# Patient Record
Sex: MALE | Race: BLACK OR AFRICAN AMERICAN | NOT HISPANIC OR LATINO | URBAN - METROPOLITAN AREA
[De-identification: names, ages, dates, MRNs, and addresses within clinical notes are randomized per-mention and may not be internally consistent; named-entity substitution may affect disease eponyms.]

---

## 2023-10-17 ENCOUNTER — HOSPITAL ENCOUNTER (EMERGENCY)
Facility: OTHER | Age: 23
Discharge: HOME | End: 2023-10-18
Attending: EMERGENCY MEDICINE

## 2023-10-17 DIAGNOSIS — R11.2 NAUSEA AND VOMITING, UNSPECIFIED VOMITING TYPE: Primary | ICD-10-CM

## 2023-10-17 LAB
ALBUMIN SERPL BCP-MCNC: 4 G/DL (ref 3.5–5.2)
ALP SERPL-CCNC: 48 U/L (ref 55–135)
ALT SERPL W/O P-5'-P-CCNC: 11 U/L (ref 10–44)
ANION GAP SERPL CALC-SCNC: 7 MMOL/L (ref 8–16)
AST SERPL-CCNC: 12 U/L (ref 10–40)
BASOPHILS # BLD AUTO: 0.03 K/UL (ref 0–0.2)
BASOPHILS NFR BLD: 0.5 % (ref 0–1.9)
BILIRUB SERPL-MCNC: 0.3 MG/DL (ref 0.1–1)
BUN SERPL-MCNC: 11 MG/DL (ref 6–20)
CALCIUM SERPL-MCNC: 8.9 MG/DL (ref 8.7–10.5)
CHLORIDE SERPL-SCNC: 108 MMOL/L (ref 95–110)
CO2 SERPL-SCNC: 26 MMOL/L (ref 23–29)
CREAT SERPL-MCNC: 0.9 MG/DL (ref 0.5–1.4)
DIFFERENTIAL METHOD: ABNORMAL
EOSINOPHIL # BLD AUTO: 0.2 K/UL (ref 0–0.5)
EOSINOPHIL NFR BLD: 3 % (ref 0–8)
ERYTHROCYTE [DISTWIDTH] IN BLOOD BY AUTOMATED COUNT: 13.3 % (ref 11.5–14.5)
EST. GFR  (NO RACE VARIABLE): >60 ML/MIN/1.73 M^2
GLUCOSE SERPL-MCNC: 80 MG/DL (ref 70–110)
HCT VFR BLD AUTO: 40.5 % (ref 40–54)
HGB BLD-MCNC: 13.6 G/DL (ref 14–18)
IMM GRANULOCYTES # BLD AUTO: 0 K/UL (ref 0–0.04)
IMM GRANULOCYTES NFR BLD AUTO: 0 % (ref 0–0.5)
LIPASE SERPL-CCNC: 96 U/L (ref 4–60)
LYMPHOCYTES # BLD AUTO: 3.7 K/UL (ref 1–4.8)
LYMPHOCYTES NFR BLD: 61.6 % (ref 18–48)
MCH RBC QN AUTO: 29.3 PG (ref 27–31)
MCHC RBC AUTO-ENTMCNC: 33.6 G/DL (ref 32–36)
MCV RBC AUTO: 87 FL (ref 82–98)
MONOCYTES # BLD AUTO: 0.5 K/UL (ref 0.3–1)
MONOCYTES NFR BLD: 7.7 % (ref 4–15)
NEUTROPHILS # BLD AUTO: 1.7 K/UL (ref 1.8–7.7)
NEUTROPHILS NFR BLD: 27.2 % (ref 38–73)
NRBC BLD-RTO: 0 /100 WBC
PLATELET # BLD AUTO: 147 K/UL (ref 150–450)
PMV BLD AUTO: 11.5 FL (ref 9.2–12.9)
POTASSIUM SERPL-SCNC: 3.6 MMOL/L (ref 3.5–5.1)
PROT SERPL-MCNC: 6.4 G/DL (ref 6–8.4)
RBC # BLD AUTO: 4.64 M/UL (ref 4.6–6.2)
SODIUM SERPL-SCNC: 141 MMOL/L (ref 136–145)
WBC # BLD AUTO: 6.07 K/UL (ref 3.9–12.7)

## 2023-10-17 PROCEDURE — 80053 COMPREHEN METABOLIC PANEL: CPT | Performed by: EMERGENCY MEDICINE

## 2023-10-17 PROCEDURE — 83690 ASSAY OF LIPASE: CPT | Performed by: EMERGENCY MEDICINE

## 2023-10-17 PROCEDURE — 99283 EMERGENCY DEPT VISIT LOW MDM: CPT

## 2023-10-17 PROCEDURE — 85025 COMPLETE CBC W/AUTO DIFF WBC: CPT | Performed by: EMERGENCY MEDICINE

## 2023-10-17 PROCEDURE — 25000003 PHARM REV CODE 250: Performed by: EMERGENCY MEDICINE

## 2023-10-17 RX ORDER — ONDANSETRON 4 MG/1
4 TABLET, ORALLY DISINTEGRATING ORAL EVERY 8 HOURS PRN
Qty: 12 TABLET | Refills: 0 | Status: SHIPPED | OUTPATIENT
Start: 2023-10-17

## 2023-10-17 RX ORDER — ONDANSETRON 4 MG/1
4 TABLET, ORALLY DISINTEGRATING ORAL
Status: COMPLETED | OUTPATIENT
Start: 2023-10-17 | End: 2023-10-17

## 2023-10-17 RX ADMIN — ONDANSETRON 4 MG: 4 TABLET, ORALLY DISINTEGRATING ORAL at 10:10

## 2023-10-18 VITALS
DIASTOLIC BLOOD PRESSURE: 62 MMHG | OXYGEN SATURATION: 98 % | TEMPERATURE: 98 F | HEART RATE: 88 BPM | RESPIRATION RATE: 19 BRPM | WEIGHT: 145 LBS | SYSTOLIC BLOOD PRESSURE: 121 MMHG

## 2023-10-18 NOTE — ED TRIAGE NOTES
PT complains of nausea and vomiting that began on September 22nd. Denies abdominal pain and diarrhea associated with nausea. Denies SOB or chest pain

## 2023-10-18 NOTE — ED PROVIDER NOTES
Encounter Date: 10/17/2023    SCRIBE #1 NOTE: I, Jasmina Davis, am scribing for, and in the presence of,  Boom Nino MD. I have scribed the following portions of the note - Other sections scribed: HPI,ROS, and PE.       History     Chief Complaint   Patient presents with    Vomiting     C/o  nausea & vomiting since sept 22nd. Pt states that he has not been seen for this.      Time seen by provider: 10:08 PM    This is a 23 y.o. male who presents with complaint of vomiting, onset approx. one month ago. Accompanied symptoms include nausea and unable to tolerate food. He denies hematemesis, fever, abdominal pain, diarrhea, or penile discharge/pain. Prior to arrival at the ED, pt states he vomited prompting him to come in. Patient reports he suddenly began vomiting in early September 2023 and it has been constant since. He states he vomits everyday 2x a day. He is only able to tolerate water at this time. Pt notes he recently had a sick dog who passed away but denies being in contact with any other sick individuals. He has not taken any OTC to subside symptoms. Of note, patient is a daily marijuana user. This is the extent of the patient's complaints at this time.          The history is provided by the patient, a friend and medical records. No  was used.     Review of patient's allergies indicates:  No Known Allergies  History reviewed. No pertinent past medical history.  No past surgical history on file.  No family history on file.     Review of Systems   Constitutional:  Positive for appetite change. Negative for chills and fever.   HENT:  Negative for congestion, rhinorrhea and sore throat.    Eyes:  Negative for pain.   Respiratory:  Negative for cough and shortness of breath.    Cardiovascular:  Negative for chest pain.   Gastrointestinal:  Positive for nausea and vomiting. Negative for abdominal pain and diarrhea.   Genitourinary:  Negative for dysuria, flank pain, penile  discharge and penile pain.   Musculoskeletal:  Negative for arthralgias and myalgias.   Skin: Negative.    Neurological:  Negative for weakness, numbness and headaches.   Hematological: Negative.    Psychiatric/Behavioral:  Negative for agitation and confusion.        Physical Exam     Initial Vitals [10/17/23 2124]   BP Pulse Resp Temp SpO2   120/69 70 18 98.6 °F (37 °C) 100 %      MAP       --         Physical Exam    Nursing note and vitals reviewed.  Constitutional: He appears well-developed and well-nourished. He is not diaphoretic. No distress.   HENT:   Head: Normocephalic and atraumatic.   Mouth/Throat: Oropharynx is clear and moist.   Eyes: Conjunctivae are normal. No scleral icterus.   Neck: Neck supple.   Cardiovascular:  Normal rate, regular rhythm, normal heart sounds and intact distal pulses.     Exam reveals no gallop and no friction rub.       No murmur heard.  Pulmonary/Chest: Breath sounds normal. He has no wheezes. He has no rhonchi. He has no rales.   Abdominal: Abdomen is soft. He exhibits no distension. There is no abdominal tenderness. There is no guarding.   Musculoskeletal:         General: No edema. Normal range of motion.      Cervical back: Neck supple.     Neurological: He is alert and oriented to person, place, and time.   Skin: Capillary refill takes less than 2 seconds. No rash noted. No erythema.   Psychiatric: He has a normal mood and affect. Thought content normal.         ED Course   Procedures  Labs Reviewed   CBC W/ AUTO DIFFERENTIAL - Abnormal; Notable for the following components:       Result Value    Hemoglobin 13.6 (*)     Platelets 147 (*)     Gran # (ANC) 1.7 (*)     Gran % 27.2 (*)     Lymph % 61.6 (*)     All other components within normal limits   COMPREHENSIVE METABOLIC PANEL - Abnormal; Notable for the following components:    Alkaline Phosphatase 48 (*)     Anion Gap 7 (*)     All other components within normal limits   LIPASE - Abnormal; Notable for the following  components:    Lipase 96 (*)     All other components within normal limits          Imaging Results    None          Medications   ondansetron disintegrating tablet 4 mg (4 mg Oral Given 10/17/23 2216)     Medical Decision Making  Amount and/or Complexity of Data Reviewed  Labs: ordered. Decision-making details documented in ED Course.    Risk  Prescription drug management.            Scribe Attestation:   Scribe #1: I performed the above scribed service and the documentation accurately describes the services I performed. I attest to the accuracy of the note.      I, Dr. Boom Nino, personally performed the services described in this documentation. All medical record entries made by the scribe were at my direction and in my presence.  I have reviewed the chart and agree that the record reflects my personal performance and is accurate and complete. Boom Nino MD.  12:28 AM 10/18/2023    Torres Flores is a 23 y.o. male presenting with approximate 1 month history vomiting in the setting of chronic cannabis use.  I suspect cannabis hyperemesis syndrome.  No nausea and vomiting here.  Sublingual ondansetron given for nausea only.  There is no associated abdominal pain.  I have very low suspicion for life-threatening intra-abdominal process such as appendicitis, abscess, cholecystitis.  I do not think abdominal imaging is indicated.  I doubt obstructive process.  Laboratories reviewed without significant electrolyte derangement, renal insult, dehydration.  Nonspecific lipase elevation noted not consistent with pancreatitis.  He is advised to follow up with PCP and GI discontinue cannabis use.  Ondansetron as necessary prescribed pending follow-up.  Return precautions reviewed.                        Clinical Impression:   Final diagnoses:  [R11.2] Nausea and vomiting, unspecified vomiting type (Primary)        ED Disposition Condition    Discharge Stable          ED Prescriptions       Medication Sig  Dispense Start Date End Date Auth. Provider    ondansetron (ZOFRAN-ODT) 4 MG TbDL Take 1 tablet (4 mg total) by mouth every 8 (eight) hours as needed (nausea, vomiting). 12 tablet 10/17/2023 -- Boom Nino MD          Follow-up Information       Follow up With Specialties Details Why Contact Info    PCP, see list, 1 week        Suzan Simon MD Gastroenterology  GI, 1-2 weeks [address] [address]  [phone]               Boom Nino MD  10/18/23 0030

## 2023-10-18 NOTE — DISCHARGE INSTRUCTIONS
Avoid use of any further cannabis products as they could be contributing to your nausea and vomiting.    [facility] [place] - [phone] - If needed for outpatient GI follow up    PCP Clinics:      [facility] - [phone]  [address] / [place]  [address]  [address] / [place]  [address]    [facility] - [phone] ([facility]), [phone] ([facility])  8+ Locations  [address] ([facility]) / [place]  [address]  [address] ([facility]) / [place]  [address]    [facility] - [phone] ([facility]), [phone] ([facility])  [address] ([facility]) / [place]  [address]  [address] ([facility]) / [place]   [address]    [facility] - [phone]  [address] / [place]  [address]  [address] / [place]  [address]    [facility] - [phone], [phone]  [address] / [place]  [address]    [facility] - [phone]  [address] ([place]) / [place]  [address]    [facility] - [phone]  [address] / [place]  [address]    [facility] the [facility] - [phone]  [address] / [address] / [place]  [address]    [facility] - [phone]  [address] / [place]  [address]